# Patient Record
Sex: MALE | Race: ASIAN | Employment: FULL TIME | URBAN - METROPOLITAN AREA
[De-identification: names, ages, dates, MRNs, and addresses within clinical notes are randomized per-mention and may not be internally consistent; named-entity substitution may affect disease eponyms.]

---

## 2017-12-20 ENCOUNTER — HOSPITAL ENCOUNTER (EMERGENCY)
Facility: CLINIC | Age: 32
Discharge: HOME | End: 2017-12-20
Attending: EMERGENCY MEDICINE | Admitting: EMERGENCY MEDICINE

## 2017-12-20 VITALS
HEART RATE: 80 BPM | SYSTOLIC BLOOD PRESSURE: 118 MMHG | HEIGHT: 66 IN | DIASTOLIC BLOOD PRESSURE: 82 MMHG | BODY MASS INDEX: 28.93 KG/M2 | RESPIRATION RATE: 13 BRPM | OXYGEN SATURATION: 99 % | WEIGHT: 180 LBS | TEMPERATURE: 97.9 F

## 2017-12-20 DIAGNOSIS — L03.211 CELLULITIS OF FACE: ICD-10-CM

## 2017-12-20 LAB
BASOPHILS # BLD AUTO: 0 10E9/L (ref 0–0.2)
BASOPHILS NFR BLD AUTO: 0.5 %
DIFFERENTIAL METHOD BLD: NORMAL
EOSINOPHIL # BLD AUTO: 0.1 10E9/L (ref 0–0.7)
EOSINOPHIL NFR BLD AUTO: 3 %
ERYTHROCYTE [DISTWIDTH] IN BLOOD BY AUTOMATED COUNT: 13.1 % (ref 10–15)
HCT VFR BLD AUTO: 40 % (ref 40–53)
HGB BLD-MCNC: 13.6 G/DL (ref 13.3–17.7)
IMM GRANULOCYTES # BLD: 0 10E9/L (ref 0–0.4)
IMM GRANULOCYTES NFR BLD: 0.3 %
LYMPHOCYTES # BLD AUTO: 1.3 10E9/L (ref 0.8–5.3)
LYMPHOCYTES NFR BLD AUTO: 31.5 %
MCH RBC QN AUTO: 29.7 PG (ref 26.5–33)
MCHC RBC AUTO-ENTMCNC: 34 G/DL (ref 31.5–36.5)
MCV RBC AUTO: 87 FL (ref 78–100)
MONOCYTES # BLD AUTO: 0.4 10E9/L (ref 0–1.3)
MONOCYTES NFR BLD AUTO: 10.3 %
NEUTROPHILS # BLD AUTO: 2.2 10E9/L (ref 1.6–8.3)
NEUTROPHILS NFR BLD AUTO: 54.4 %
NRBC # BLD AUTO: 0 10*3/UL
NRBC BLD AUTO-RTO: 0 /100
PLATELET # BLD AUTO: 195 10E9/L (ref 150–450)
RBC # BLD AUTO: 4.58 10E12/L (ref 4.4–5.9)
WBC # BLD AUTO: 4 10E9/L (ref 4–11)

## 2017-12-20 PROCEDURE — 96365 THER/PROPH/DIAG IV INF INIT: CPT

## 2017-12-20 PROCEDURE — 85025 COMPLETE CBC W/AUTO DIFF WBC: CPT | Performed by: EMERGENCY MEDICINE

## 2017-12-20 PROCEDURE — 25000128 H RX IP 250 OP 636: Performed by: EMERGENCY MEDICINE

## 2017-12-20 PROCEDURE — 99284 EMERGENCY DEPT VISIT MOD MDM: CPT | Mod: 25

## 2017-12-20 RX ORDER — SULFAMETHOXAZOLE/TRIMETHOPRIM 800-160 MG
1 TABLET ORAL 2 TIMES DAILY
Qty: 7 TABLET | Refills: 0 | Status: SHIPPED | OUTPATIENT
Start: 2017-12-20 | End: 2017-12-25

## 2017-12-20 RX ORDER — CEPHALEXIN 500 MG/1
500 CAPSULE ORAL 4 TIMES DAILY
Qty: 28 CAPSULE | Refills: 0 | Status: SHIPPED | OUTPATIENT
Start: 2017-12-20 | End: 2017-12-27

## 2017-12-20 RX ORDER — CEFAZOLIN SODIUM 1 G/3ML
1 INJECTION, POWDER, FOR SOLUTION INTRAMUSCULAR; INTRAVENOUS ONCE
Status: DISCONTINUED | OUTPATIENT
Start: 2017-12-20 | End: 2017-12-20

## 2017-12-20 RX ADMIN — CEFAZOLIN SODIUM 1 G: 1 SOLUTION INTRAVENOUS at 19:39

## 2017-12-20 ASSESSMENT — ENCOUNTER SYMPTOMS
FACIAL SWELLING: 1
HEADACHES: 1
EYE PAIN: 1

## 2017-12-20 NOTE — ED AVS SNAPSHOT
Emergency Department    [address] [facility] [address]    Phone:  [phone]    Fax:  [phone]                                       Zachary Randolph   MRN: [id number]    Department:   Emergency Department   Date of Visit:  12/20/2017           Patient Information     Date Of Birth          1985        Your diagnoses for this visit were:     Cellulitis of face left eyelid       You were seen by Charbel Bustillos MD.      Follow-up Information     Follow up with [facility] In 2 days.    Contact information:    [address] [facility] [address] [phone]          Follow up with  Emergency Department.    Specialty:  EMERGENCY MEDICINE    Why:  As needed, If symptoms worsen    Contact information:    [address] [facility] [address] [phone]        Discharge Instructions       *Take medications as prescribed.  Keflex as directed, bactrim if redness and swelling are not improving by tomorrow. Continue your current medications  *Follow-up with your doctor for a recheck in the next 24-48 hours.  *Return if you develop fever, rapid spread or pain/redness/warmth, worsening pain, faint or feel like you will faint or become worse in any way.      Discharge Instructions  Cellulitis    Cellulitis is an infection of the skin that occurs when bacteria enter the skin.   Symptoms are generally redness, swelling, warmth and pain.  Your infection appeared to be appropriate to treat at home with antibiotics.  However, sometimes your infection may be worse than it seemed at first, or may worsen with time. If you have new or worse symptoms, you may need to be seen again in the Emergency Department or by your primary doctor.    Return to the Emergency Department if:    The redness, pain, or swelling gets a lot worse.  If the red area was marked, return if it is red beyond the marked area.    You are unable to get your antibiotics, or are vomiting  "them up or you can t take them.    You are feeling more ill, weak or lightheaded.    You start to run a new fever (temperature >101).    Anything else about the infection worries or concerns you.  Treatment:    Start your antibiotics right away, and take them as prescribed. Be sure to finish the whole prescription, even if you are better.    Apply a heating pad, warm packs, or warm water soaks to the infected area for 15 minutes at a time, at least 3 times a day. Do not use a heating pad on your feet or legs if you have diabetes. Do not sleep with a heating pad on, since this can cause burns or skin injury.    Rest your injured area for at least 1-2 days. After that you may start using your extremity again as long as there is not too much pain.     Raise the injured area above the level of your heart as much as possible in the first 1-2 days.    Tylenol  (acetaminophen), Motrin  (ibuprofen), or Advil  (ibuprofen) may help may help reduce pain and fever and may help you feel more comfortable. Be sure to read and follow the package directions, and ask your doctor if you have questions.    Follow-up with your doctor:    Re-check in clinic within 2-3 days.  Probiotics: If you have been given an antibiotic, you may want to also take a probiotic pill or eat yogurt with live cultures. Probiotics have \"good bacteria\" to help your intestines stay healthy. Studies have shown that probiotics help prevent diarrhea and other intestine problems (including C. diff infection) when you take antibiotics. You can buy these without a prescription in the pharmacy section of the store.     If you were given a prescription for medicine here today, be sure to read all of the information (including the package insert) that comes with your prescription.  This will include important information about the medicine, its side effects, and any warnings that you need to know about.  The pharmacist who fills the prescription can provide more " information and answer questions you may have about the medicine.  If you have questions or concerns that the pharmacist cannot address, please call or return to the Emergency Department.     24 Hour Appointment Hotline       To make an appointment at any [facility], call [phone] ([phone]). If you don't have a family doctor or clinic, we will help you find one. [facility] clinics are conveniently located to serve the needs of you and your family.             Review of your medicines      START taking        Dose / Directions Last dose taken    cephALEXin 500 MG capsule   Commonly known as:  KEFLEX   Dose:  500 mg   Quantity:  28 capsule        Take 1 capsule (500 mg) by mouth 4 times daily for 7 days   Refills:  0        sulfamethoxazole-trimethoprim 800-160 MG per tablet   Commonly known as:  BACTRIM DS   Dose:  1 tablet   Quantity:  7 tablet        Take 1 tablet by mouth 2 times daily for 5 days   Refills:  0                Prescriptions were sent or printed at these locations (2 Prescriptions)                   Other Prescriptions                Printed at Department/Unit printer (2 of 2)         cephALEXin (KEFLEX) 500 MG capsule               sulfamethoxazole-trimethoprim (BACTRIM DS) 800-160 MG per tablet                Procedures and tests performed during your visit     CBC with platelets + differential      Orders Needing Specimen Collection     None      Pending Results     No orders found from 12/18/2017 to 12/21/2017.            Pending Culture Results     No orders found from 12/18/2017 to 12/21/2017.            Pending Results Instructions     If you had any lab results that were not finalized at the time of your Discharge, you can call the ED Lab Result RN at [phone]. You will be contacted by this team for any positive Lab results or changes in treatment. The nurses are available 7 days a week from 10A to 6:30P.  You can leave a message 24 hours per day and they will return your  call.        Test Results From Your Hospital Stay        12/20/2017  7:50 PM      Component Results     Component Value Ref Range & Units Status    WBC 4.0 4.0 - 11.0 10e9/L Final    RBC Count 4.58 4.4 - 5.9 10e12/L Final    Hemoglobin 13.6 13.3 - 17.7 g/dL Final    Hematocrit 40.0 40.0 - 53.0 % Final    MCV 87 78 - 100 fl Final    MCH 29.7 26.5 - 33.0 pg Final    MCHC 34.0 31.5 - 36.5 g/dL Final    RDW 13.1 10.0 - 15.0 % Final    Platelet Count 195 150 - 450 10e9/L Final    Diff Method Automated Method  Final    % Neutrophils 54.4 % Final    % Lymphocytes 31.5 % Final    % Monocytes 10.3 % Final    % Eosinophils 3.0 % Final    % Basophils 0.5 % Final    % Immature Granulocytes 0.3 % Final    Nucleated RBCs 0 0 /100 Final    Absolute Neutrophil 2.2 1.6 - 8.3 10e9/L Final    Absolute Lymphocytes 1.3 0.8 - 5.3 10e9/L Final    Absolute Monocytes 0.4 0.0 - 1.3 10e9/L Final    Absolute Eosinophils 0.1 0.0 - 0.7 10e9/L Final    Absolute Basophils 0.0 0.0 - 0.2 10e9/L Final    Abs Immature Granulocytes 0.0 0 - 0.4 10e9/L Final    Absolute Nucleated RBC 0.0  Final                Clinical Quality Measure: Blood Pressure Screening     Your blood pressure was checked while you were in the emergency department today. The last reading we obtained was  BP: 132/72 . Please read the guidelines below about what these numbers mean and what you should do about them.  If your systolic blood pressure (the top number) is less than 120 and your diastolic blood pressure (the bottom number) is less than 80, then your blood pressure is normal. There is nothing more that you need to do about it.  If your systolic blood pressure (the top number) is 120-139 or your diastolic blood pressure (the bottom number) is 80-89, your blood pressure may be higher than it should be. You should have your blood pressure rechecked within a year by a primary care provider.  If your systolic blood pressure (the top number) is 140 or greater or your diastolic  "blood pressure (the bottom number) is 90 or greater, you may have high blood pressure. High blood pressure is treatable, but if left untreated over time it can put you at risk for heart attack, stroke, or kidney failure. You should have your blood pressure rechecked by a primary care provider within the next 4 weeks.  If your provider in the emergency department today gave you specific instructions to follow-up with your doctor or provider even sooner than that, you should follow that instruction and not wait for up to 4 weeks for your follow-up visit.        Thank you for choosing [facility]       Thank you for choosing [facility] for your care. Our goal is always to provide you with excellent care. Hearing back from our patients is one way we can continue to improve our services. Please take a few minutes to complete the written survey that you may receive in the mail after you visit with us. Thank you!        [facility] Information     [email or web address] lets you send messages to your doctor, view your test results, renew your prescriptions, schedule appointments and more. To sign up, go to [email or web address] . Click on \"Log in\" on the left side of the screen, which will take you to the Welcome page. Then click on \"Sign up Now\" on the right side of the page.     You will be asked to enter the access code listed below, as well as some personal information. Please follow the directions to create your username and password.     Your access code is: [email or web address]  Expires: 3/20/2018  8:46 PM     Your access code will  in 90 days. If you need help or a new code, please call your [facility] clinic or [phone].        Care EveryWhere ID     This is your Care EveryWhere ID. This could be used by other organizations to access your [facility] medical records  [id number]        Equal Access to Services     TRENT FRANKLIN AH: Rah Sethi, ponce shukla, desiree lama " la'james king. So [facility] [phone].    ATENCIÓN: Si habla español, tiene a salinas disposición servicios gratuitos de asistencia lingüística. Llame al [phone].    We comply with applicable federal civil rights laws and [place] laws. We do not discriminate on the basis of race, color, national origin, age, disability, sex, sexual orientation, or gender identity.            After Visit Summary       This is your record. Keep this with you and show to your community pharmacist(s) and doctor(s) at your next visit.

## 2017-12-20 NOTE — LETTER
December 20, 2017      To Whom It May Concern:      Zachary Hinkle Agustín was seen in our Emergency Department today, 12/20/17.  I expect his condition to improve over the next 2 days.  He may return to work when improved.    Sincerely,        Edel Gonzales PA-C

## 2017-12-20 NOTE — ED AVS SNAPSHOT
Emergency Department    [address] [facility] [address]    Phone:  [phone]    Fax:  [phone]                                       Zachary Randolph   MRN: [id number]    Department:   Emergency Department   Date of Visit:  12/20/2017           After Visit Summary Signature Page     I have received my discharge instructions, and my questions have been answered. I have discussed any challenges I see with this plan with the nurse or doctor.    ..........................................................................................................................................  Patient/Patient Representative Signature      ..........................................................................................................................................  Patient Representative Print Name and Relationship to Patient    ..................................................               ................................................  Date                                            Time    ..........................................................................................................................................  Reviewed by Signature/Title    ...................................................              ..............................................  Date                                                            Time

## 2017-12-21 NOTE — ED PROVIDER NOTES
"  History     Chief Complaint:  Wound check    HPI   Zachary Randolph is a 32 year old male with a history of cystic acne and HIV who presents for a wound check. The patient states that he had an acne breakout yesterday on his face. When the patient woke up today he noted two cysts involving his left eye lid and he notes that the eye has been swollen shut all day. The top cyst has been draining but the lower one on the eyelid has not opened and is not draining. This is causing a large pressure like sensation in the area and the patient has reported a headache all day today. He denies having had any fevers or chills. The patient has never presented to the emergency department for an issue like this before and he states that it has never been this severe before.    Allergies:  Codeine    Medications:    Atripla  Tumeric    Past Medical History:    HIV positive  Cystic acne  Secondary syphillus  Chlamydia    Past Surgical History:    History reviewed. No pertinent past surgical history.     Family History:    The patient denies any relevant family medical history.     Social History:  The patient was accompanied to the ED by his friend.  Smoking Status: No  Smokeless Tobacco: No  Marital Status:  Single [1]    Review of Systems   HENT: Positive for facial swelling.    Eyes: Positive for pain.   Neurological: Positive for headaches.   All other systems reviewed and are negative.    Physical Exam   Vitals:  Patient Vitals for the past 24 hrs:   BP Temp Temp src Pulse Heart Rate Resp SpO2 Height Weight   12/20/17 2056 118/82 97.9  F (36.6  C) Oral 80 - 13 99 % - -   12/20/17 1742 132/72 98.2  F (36.8  C) Oral - 79 16 100 % 1.676 m (5' 6\") 81.6 kg (180 lb)        Physical Exam  General: Alert, interactive in mild distress.   Head:  Scalp is atraumatic. Left eye is swollen shut. Swelling and redness to left eyelid. There are 2 small pustules just inferior to medial eyebrow. Small area of induration surrounding " these pustules.   Eyes:  EOM intact. The pupils are equal, round, and reactive to light. No scleral icterus. No conjunctivitis. No periorbital cellulitis.   ENT:                                      Ears:  The external ears are normal.  Nose:  The external nose is normal.  Throat:  The oropharynx is normal. Mucus membranes are moist.                 Neck:  Normal range of motion. There is no rigidity.   MS:  Normal strength in all 4 extremities,  Skin:  Warm and dry  Psych:  Awake. Alert. Appropriate interactions.   Lymph: No anterior or posterior cervical lymphadenopathy noted.       Emergency Department Course     Laboratory:  Laboratory findings were communicated with the patient who voiced understanding of the findings.  CBC: AWNL (WBC 4.0, HGB 13.6, )     Interventions:  2047 Ancef 1 g IV    Emergency Department Course:  Nursing notes and vitals reviewed.  I performed an exam of the patient as documented above.   IV was inserted and blood was drawn for laboratory testing, results above.     I personally reviewed the laboratory results with the Patient and answered all related questions prior to discharge.    Impression & Plan      Medical Decision Making:  Zachary Randolph is a 32 year old male who presents with left eyelid swelling and redness.  On exam, he had 2 pustules with an area of induration surrounding these surrounded by redness and swelling of the left eyelid.  Patient's EOM were intact and the cellulitis was contained to his left eyelid with no orbital involvement. I&D not warranted at this time. Due to the cellulitis being close to the patient's eye, IV antibiotics were given.  Ancef given and on reexamination the patient was able to open his eyelid and eyelid swelling appeared to be decreased.  I was able to massage a small amount of pus out of 1 of the pustules.  Recommended keeping the area moist with bacitracin ointment.  Given Keflex and Bactrim to take at home.  Strict  return precautions discussed including fever, chills, worsening swelling/spreading redness, or any other new/concerning findings. Needs close f/u of primary care provider. Warning signs for wound given on discharge instructions and verbally; see d/c instructions.  Pt agrees w/ abx and waiting to see resolution vs abscess formation.    Diagnosis:    ICD-10-CM    1. Cellulitis of face L03.211     left eyelid      Disposition:   Discharged    Discharge Medications:  Discharge Medication List as of 12/20/2017  8:47 PM      START taking these medications    Details   cephALEXin (KEFLEX) 500 MG capsule Take 1 capsule (500 mg) by mouth 4 times daily for 7 days, Disp-28 capsule, R-0, Local Print      sulfamethoxazole-trimethoprim (BACTRIM DS) 800-160 MG per tablet Take 1 tablet by mouth 2 times daily for 5 days, Disp-7 tablet, R-0, Local Print             Scribe Disclosure:  Lio BUCHANAN, am serving as a scribe at 6:31 PM on 12/20/2017 to document services personally performed by Edel Gonzales PA-C, based on my observations and the provider's statements to me.    EMERGENCY DEPARTMENT       Edel Gonzales PA-C  12/20/17 5534

## 2017-12-21 NOTE — DISCHARGE INSTRUCTIONS
*Take medications as prescribed.  Keflex as directed, bactrim if redness and swelling are not improving by tomorrow. Continue your current medications  *Follow-up with your doctor for a recheck in the next 24-48 hours.  *Return if you develop fever, rapid spread or pain/redness/warmth, worsening pain, faint or feel like you will faint or become worse in any way.      Discharge Instructions  Cellulitis    Cellulitis is an infection of the skin that occurs when bacteria enter the skin.   Symptoms are generally redness, swelling, warmth and pain.  Your infection appeared to be appropriate to treat at home with antibiotics.  However, sometimes your infection may be worse than it seemed at first, or may worsen with time. If you have new or worse symptoms, you may need to be seen again in the Emergency Department or by your primary doctor.    Return to the Emergency Department if:    The redness, pain, or swelling gets a lot worse.  If the red area was marked, return if it is red beyond the marked area.    You are unable to get your antibiotics, or are vomiting them up or you can t take them.    You are feeling more ill, weak or lightheaded.    You start to run a new fever (temperature >101).    Anything else about the infection worries or concerns you.  Treatment:    Start your antibiotics right away, and take them as prescribed. Be sure to finish the whole prescription, even if you are better.    Apply a heating pad, warm packs, or warm water soaks to the infected area for 15 minutes at a time, at least 3 times a day. Do not use a heating pad on your feet or legs if you have diabetes. Do not sleep with a heating pad on, since this can cause burns or skin injury.    Rest your injured area for at least 1-2 days. After that you may start using your extremity again as long as there is not too much pain.     Raise the injured area above the level of your heart as much as possible in the first 1-2 days.    Tylenol   "(acetaminophen), Motrin  (ibuprofen), or Advil  (ibuprofen) may help may help reduce pain and fever and may help you feel more comfortable. Be sure to read and follow the package directions, and ask your doctor if you have questions.    Follow-up with your doctor:    Re-check in clinic within 2-3 days.  Probiotics: If you have been given an antibiotic, you may want to also take a probiotic pill or eat yogurt with live cultures. Probiotics have \"good bacteria\" to help your intestines stay healthy. Studies have shown that probiotics help prevent diarrhea and other intestine problems (including C. diff infection) when you take antibiotics. You can buy these without a prescription in the pharmacy section of the store.     If you were given a prescription for medicine here today, be sure to read all of the information (including the package insert) that comes with your prescription.  This will include important information about the medicine, its side effects, and any warnings that you need to know about.  The pharmacist who fills the prescription can provide more information and answer questions you may have about the medicine.  If you have questions or concerns that the pharmacist cannot address, please call or return to the Emergency Department.   "

## 2018-01-18 ENCOUNTER — HOSPITAL ENCOUNTER (EMERGENCY)
Facility: CLINIC | Age: 33
Discharge: HOME | End: 2018-01-18
Attending: EMERGENCY MEDICINE | Admitting: EMERGENCY MEDICINE
Payer: COMMERCIAL

## 2018-01-18 VITALS
HEART RATE: 62 BPM | RESPIRATION RATE: 18 BRPM | OXYGEN SATURATION: 98 % | WEIGHT: 196.4 LBS | SYSTOLIC BLOOD PRESSURE: 130 MMHG | BODY MASS INDEX: 31.57 KG/M2 | DIASTOLIC BLOOD PRESSURE: 74 MMHG | TEMPERATURE: 97.9 F | HEIGHT: 66 IN

## 2018-01-18 DIAGNOSIS — L03.119 CELLULITIS AND ABSCESS OF LEG: ICD-10-CM

## 2018-01-18 DIAGNOSIS — L02.419 CELLULITIS AND ABSCESS OF LEG: ICD-10-CM

## 2018-01-18 PROCEDURE — 25000125 ZZHC RX 250: Performed by: EMERGENCY MEDICINE

## 2018-01-18 PROCEDURE — 25000132 ZZH RX MED GY IP 250 OP 250 PS 637: Performed by: EMERGENCY MEDICINE

## 2018-01-18 PROCEDURE — 99284 EMERGENCY DEPT VISIT MOD MDM: CPT | Mod: 25

## 2018-01-18 PROCEDURE — 10060 I&D ABSCESS SIMPLE/SINGLE: CPT

## 2018-01-18 RX ORDER — CEPHALEXIN 500 MG/1
500 CAPSULE ORAL 4 TIMES DAILY
Qty: 40 CAPSULE | Refills: 0 | Status: SHIPPED | OUTPATIENT
Start: 2018-01-18 | End: 2018-01-28

## 2018-01-18 RX ORDER — SULFAMETHOXAZOLE/TRIMETHOPRIM 800-160 MG
1 TABLET ORAL 2 TIMES DAILY
Qty: 20 TABLET | Refills: 0 | Status: SHIPPED | OUTPATIENT
Start: 2018-01-18 | End: 2018-01-28

## 2018-01-18 RX ORDER — OXYCODONE HYDROCHLORIDE 5 MG/1
5 TABLET ORAL ONCE
Status: COMPLETED | OUTPATIENT
Start: 2018-01-18 | End: 2018-01-18

## 2018-01-18 RX ADMIN — OXYCODONE HYDROCHLORIDE 5 MG: 5 TABLET ORAL at 04:07

## 2018-01-18 RX ADMIN — Medication 5 ML: at 04:04

## 2018-01-18 ASSESSMENT — ENCOUNTER SYMPTOMS
WOUND: 1
COLOR CHANGE: 1

## 2018-01-18 NOTE — DISCHARGE INSTRUCTIONS
Abscess (Incision & Drainage)  An abscess is sometimes called a boil. It happens when bacteria get trapped under the skin and start to grow. Pus forms inside the abscess as the body responds to the bacteria. An abscess can happen with an insect bite, ingrown hair, blocked oil gland, pimple, cyst, or puncture wound.  Your healthcare provider has drained the pus from your abscess. If the abscess pocket was large, your healthcare provider may have put in gauze packing. Your provider will need to remove it on your next visit. He or she may also replace it at that time. You may not need antibiotics to treat a simple abscess, unless the infection is spreading into the skin around the wound (cellulitis).  The wound will take about 1 to 2 weeks to heal, depending on the size of the abscess. Healthy tissue will grow from the bottom and sides of the opening until it seals over.  Home care  These tips can help your wound heal:    The wound may drain for the first 2 days. Cover the wound with a clean dry dressing. Change the dressing if it becomes soaked with blood or pus.    If a gauze packing was placed inside the abscess pocket, you may be told to remove it yourself. You may do this in the shower. Once the packing is removed, you should wash the area in the shower, or clean the area as directed by your provider. Continue to do this until the skin opening has closed. Make sure you wash your hands after changing the packing or cleaning the wound.    If you were prescribed antibiotics, take them as directed until they are all gone.    You may use acetaminophen or ibuprofen to control pain, unless another pain medicine was prescribed. If you have liver disease or ever had a stomach ulcer, talk with your doctor before using these medicines.  Follow-up care  Follow up with your healthcare provider, or as advised. If a gauze packing was put in your wound, it should be removed in 1 to 2 days. Check your wound every day for any  signs that the infection is getting worse. The signs are listed below.  When to seek medical advice  Call your healthcare provider right away if any of these occur:    Increasing redness or swelling    Red streaks in the skin leading away from the wound    Increasing local pain or swelling    Continued pus draining from the wound 2 days after treatment    Fever of 100.4 F (38 C) or higher, or as directed by your healthcare provider    Boil returns when you are at home  Date Last Reviewed: 9/1/2016 2000-2017 [employer]. [address] [place], [place] [id number]. All rights reserved. This information is not intended as a substitute for professional medical care. Always follow your healthcare professional's instructions.

## 2018-01-18 NOTE — ED AVS SNAPSHOT
Emergency Department    [address] [facility] [address]    Phone:  [phone]    Fax:  [phone]                                       Zachary Randolph   MRN: [id number]    Department:   Emergency Department   Date of Visit:  1/18/2018           After Visit Summary Signature Page     I have received my discharge instructions, and my questions have been answered. I have discussed any challenges I see with this plan with the nurse or doctor.    ..........................................................................................................................................  Patient/Patient Representative Signature      ..........................................................................................................................................  Patient Representative Print Name and Relationship to Patient    ..................................................               ................................................  Date                                            Time    ..........................................................................................................................................  Reviewed by Signature/Title    ...................................................              ..............................................  Date                                                            Time

## 2018-01-18 NOTE — ED PROVIDER NOTES
"  History     Chief Complaint:  Wound Check    The history is provided by the patient.      Zachary Randolph is a 32 year old male with history of HIV who presents for a wound check. The patient reports a painful area of redness that developed on his medial left thigh 1/15/18 and it has worsened since then. The area is red and swollen and it is keeping him awake at night secondary to his pain. He is also concerned of another spot that seems to be developing between his eyebrows. He denies any fever and has no other medical concerns.    Allergies:  Codeine      Medications:    Atripla    Past Medical History:    HIV positive    Past Surgical History:    History reviewed. No pertinent surgical history.     Family History:    History reviewed. No pertinent family history.      Social History:  Presents with spouse   Tobacco use: Never  Alcohol use: Yes  PCP: [facility]    Marital Status:        Review of Systems   Constitutional: Negative for fever.   Skin: Positive for color change and wound.   All other systems reviewed and are negative.    Physical Exam     Patient Vitals for the past 24 hrs:   BP Temp Temp src Pulse Heart Rate Resp SpO2 Height Weight   01/18/18 0505 130/74 - - 62 - 18 98 % - -   01/18/18 0336 132/86 97.9  F (36.6  C) Oral - 68 18 100 % 1.676 m (5' 6\") 89.1 kg (196 lb 6.4 oz)     Physical Exam  General: Appears well-developed and well-nourished.   Head: No signs of trauma.   CV: Normal rate and regular rhythm.    Resp: Effort normal and breath sounds normal. No respiratory distress.   GI: Soft. There is no tenderness.  No rebound or guarding.  Normal bowel sounds.    MSK: Normal range of motion. no edema. No Calf tenderness.  Neuro: The patient is alert and oriented.  Speech normal.  GCS 15  Skin: Skin is warm and dry. Induration and tenderness to left upper medial thigh.  Darkening of skin over induration.  Minor induration to skin between eyebrows.  No overlying " erythema.    Psych: normal mood and affect. behavior is normal.       Emergency Department Course   Procedures:  Results for orders placed during the hospital encounter of 01/18/18   POC US SOFT TISSUE    Impression [facility] Procedure Note     Limited Bedside ED Ultrasound of Soft Tissue:    PROCEDURE: PERFORMED BY: Dr. Rubin Lawler  INDICATIONS/SYMPTOM: Skin redness, evaluate for abscess, cellulitis or foreign body  PROBE: High frequency linear probe  BODY LOCATION: Soft tissue located on extremity, left thigh.     FINDINGS: Cobblestoning of soft tissue: present   Hypoechoic fluid (ie abscess) identified: present measuring 1 cm   US utilized to access fluid pocket with sterile blade  INTERPRETATION:  The soft tissue and muscle layers were evaluated.      Findings indicate cellulitis and abscess    IMAGE DOCUMENTATION: Images were archived to PACs system.         Procedure: Incision and Drainage     LOCATIONS:  Medial left thigh    ANESTHESIA:  LET and bupivacaine.      PREPARATION:  Cleansed with Betadine    PROCEDURE:  Area was incised with # 11 Blade (Sharp Point) with a Single Straight incision.  Wound treatment included Deloculation, Purulent Drainage and Expression of Material.  Packing consisted of Plain Gauze.  Appropriate dressing was applied to cover the area.    Patient Status: Patient tolerated the procedure well. There were no complications.       Interventions:  0404: LET 5 mLs Topical  0407: Roxicodone 5 mg PO    Emergency Department Course:  Past medical records, nursing notes, and vitals reviewed.  0350: I performed an exam of the patient and obtained history, as documented above.    I rechecked the patient. I performed a POCUS and IND as noted above.     I rechecked the patient. Findings and plan explained to the Patient. Patient discharged home with instructions regarding supportive care, medications, and reasons to return. The importance of close follow-up was reviewed.     Impression & Plan    Medical Decision Making:  Zachary Randolph is a 32 year old gentleman who presents due to an area of concern for infection to his left thigh. For last few days he has had a firm tender area that is gradually getting worse.  He also has a small place between his eyebrows. On my evaluation, he did have some induration to the bilateral areas, but no obvious fluctuance or drainage.  I did do a bedside ultrasound which showed an area fluid collection concerning for an abscess to the left thigh but there is no fluid collection on the forehead area.  I did perform an I&D with some purulent drainage.  Packing was placed in the thigh.  Recommended warm compresses to both areas the patient was given a prescription for Keflex and Bactrim.  Patient does have HIV, but reports that his viral load is not detectable and his CD4 count is normal.  He has an appointment to see his doctor in 4 days which I recommend that he keep for recheck.  Instructed to return to the ER for any new or worsening symptoms or further concerns.      Diagnosis:    ICD-10-CM    1. Cellulitis and abscess of leg L03.119     L02.419        Disposition:  Discharged to home with plan as outlined.    Discharge Medications:  Discharge Medication List as of 1/18/2018  5:01 AM      START taking these medications    Details   cephALEXin (KEFLEX) 500 MG capsule Take 1 capsule (500 mg) by mouth 4 times daily for 10 days, Disp-40 capsule, R-0, Local Print      sulfamethoxazole-trimethoprim (BACTRIM DS) 800-160 MG per tablet Take 1 tablet by mouth 2 times daily for 10 days, Disp-20 tablet, R-0, Local Print               Derian Duffy  1/18/2018    EMERGENCY DEPARTMENT  Derian BUCHANAN am serving as a scribe at 3:50 AM on 1/18/2018 to document services personally performed by Rubin Lawler MD based on my observations and the provider's statements to me.       Rubin Lawler MD  01/19/18 8699

## 2018-01-18 NOTE — ED AVS SNAPSHOT
Emergency Department    [address] [facility] [address]    Phone:  [phone]    Fax:  [phone]                                       Zachary Randolph   MRN: [id number]    Department:   Emergency Department   Date of Visit:  1/18/2018           Patient Information     Date Of Birth          1985        Your diagnoses for this visit were:     Cellulitis and abscess of leg        You were seen by Rubin Lawler MD.      Follow-up Information     Follow up with [facility] On 1/22/2018.    Contact information:    [address] [facility] [address] [phone]          Follow up with  Emergency Department.    Specialty:  EMERGENCY MEDICINE    Why:  As needed, If symptoms worsen    Contact information:    [address] [facility] [address] [phone]        Discharge Instructions         Abscess (Incision & Drainage)  An abscess is sometimes called a boil. It happens when bacteria get trapped under the skin and start to grow. Pus forms inside the abscess as the body responds to the bacteria. An abscess can happen with an insect bite, ingrown hair, blocked oil gland, pimple, cyst, or puncture wound.  Your healthcare provider has drained the pus from your abscess. If the abscess pocket was large, your healthcare provider may have put in gauze packing. Your provider will need to remove it on your next visit. He or she may also replace it at that time. You may not need antibiotics to treat a simple abscess, unless the infection is spreading into the skin around the wound (cellulitis).  The wound will take about 1 to 2 weeks to heal, depending on the size of the abscess. Healthy tissue will grow from the bottom and sides of the opening until it seals over.  Home care  These tips can help your wound heal:    The wound may drain for the first 2 days. Cover the wound with a clean dry dressing. Change the dressing if it becomes soaked with  blood or pus.    If a gauze packing was placed inside the abscess pocket, you may be told to remove it yourself. You may do this in the shower. Once the packing is removed, you should wash the area in the shower, or clean the area as directed by your provider. Continue to do this until the skin opening has closed. Make sure you wash your hands after changing the packing or cleaning the wound.    If you were prescribed antibiotics, take them as directed until they are all gone.    You may use acetaminophen or ibuprofen to control pain, unless another pain medicine was prescribed. If you have liver disease or ever had a stomach ulcer, talk with your doctor before using these medicines.  Follow-up care  Follow up with your healthcare provider, or as advised. If a gauze packing was put in your wound, it should be removed in 1 to 2 days. Check your wound every day for any signs that the infection is getting worse. The signs are listed below.  When to seek medical advice  Call your healthcare provider right away if any of these occur:    Increasing redness or swelling    Red streaks in the skin leading away from the wound    Increasing local pain or swelling    Continued pus draining from the wound 2 days after treatment    Fever of 100.4 F (38 C) or higher, or as directed by your healthcare provider    Boil returns when you are at home  Date Last Reviewed: 9/1/2016 2000-2017 [employer]. [address] [place] [address], [place] [id number]. All rights reserved. This information is not intended as a substitute for professional medical care. Always follow your healthcare professional's instructions.          24 Hour Appointment Hotline       To make an appointment at any [facility] clinic, call [phone] ([phone]). If you don't have a family doctor or clinic, we will help you find one. [facility] clinics are conveniently located to serve the needs of you and your family.             Review of your medicines       START taking        Dose / Directions Last dose taken    cephALEXin 500 MG capsule   Commonly known as:  KEFLEX   Dose:  500 mg   Quantity:  40 capsule        Take 1 capsule (500 mg) by mouth 4 times daily for 10 days   Refills:  0        sulfamethoxazole-trimethoprim 800-160 MG per tablet   Commonly known as:  BACTRIM DS   Dose:  1 tablet   Quantity:  20 tablet        Take 1 tablet by mouth 2 times daily for 10 days   Refills:  0                Prescriptions were sent or printed at these locations (2 Prescriptions)                   Other Prescriptions                Printed at Department/Unit printer (2 of 2)         cephALEXin (KEFLEX) 500 MG capsule               sulfamethoxazole-trimethoprim (BACTRIM DS) 800-160 MG per tablet                Procedures and tests performed during your visit     POC US SOFT TISSUE      Orders Needing Specimen Collection     None      Pending Results     Date and Time Order Name Status Description    1/18/2018 0353 POC US SOFT TISSUE In process             Pending Culture Results     No orders found from 1/16/2018 to 1/19/2018.            Pending Results Instructions     If you had any lab results that were not finalized at the time of your Discharge, you can call the ED Lab Result RN at [phone]. You will be contacted by this team for any positive Lab results or changes in treatment. The nurses are available 7 days a week from 10A to 6:30P.  You can leave a message 24 hours per day and they will return your call.        Test Results From Your Hospital Stay        1/18/2018  3:53 AM      Result not yet available     Exam Begun                Clinical Quality Measure: Blood Pressure Screening     Your blood pressure was checked while you were in the emergency department today. The last reading we obtained was  BP: 132/86 . Please read the guidelines below about what these numbers mean and what you should do about them.  If your systolic blood pressure (the top number) is  "less than 120 and your diastolic blood pressure (the bottom number) is less than 80, then your blood pressure is normal. There is nothing more that you need to do about it.  If your systolic blood pressure (the top number) is 120-139 or your diastolic blood pressure (the bottom number) is 80-89, your blood pressure may be higher than it should be. You should have your blood pressure rechecked within a year by a primary care provider.  If your systolic blood pressure (the top number) is 140 or greater or your diastolic blood pressure (the bottom number) is 90 or greater, you may have high blood pressure. High blood pressure is treatable, but if left untreated over time it can put you at risk for heart attack, stroke, or kidney failure. You should have your blood pressure rechecked by a primary care provider within the next 4 weeks.  If your provider in the emergency department today gave you specific instructions to follow-up with your doctor or provider even sooner than that, you should follow that instruction and not wait for up to 4 weeks for your follow-up visit.        Thank you for choosing [facility]       Thank you for choosing [facility] for your care. Our goal is always to provide you with excellent care. Hearing back from our patients is one way we can continue to improve our services. Please take a few minutes to complete the written survey that you may receive in the mail after you visit with us. Thank you!        [employer] Information     [employer] lets you send messages to your doctor, view your test results, renew your prescriptions, schedule appointments and more. To sign up, go to [email or web address] . Click on \"Log in\" on the left side of the screen, which will take you to the Welcome page. Then click on \"Sign up Now\" on the right side of the page.     You will be asked to enter the access code listed below, as well as some personal information. Please follow the directions to create your username and " password.     Your access code is: [email or web address]  Expires: 3/20/2018  8:46 PM     Your access code will  in 90 days. If you need help or a new code, please call your [place] clinic or [phone].        Care EveryWhere ID     This is your Care EveryWhere ID. This could be used by other organizations to access your [place] medical records  [id number]        Equal Access to Services     TRENT FRANKLIN : Rah lovello Somisael, waaxda luqadaha, qaybta kaalmada adeegyada, desiree king . So [facility] [phone].    ATENCIÓN: Si habla español, tiene a salinas disposición servicios gratuitos de asistencia lingüística. Llame al [phone].    We comply with applicable federal civil rights laws and [place] laws. We do not discriminate on the basis of race, color, national origin, age, disability, sex, sexual orientation, or gender identity.            After Visit Summary       This is your record. Keep this with you and show to your community pharmacist(s) and doctor(s) at your next visit.

## 2018-01-19 ENCOUNTER — NURSE TRIAGE (OUTPATIENT)
Dept: NURSING | Facility: CLINIC | Age: 33
End: 2018-01-19

## 2018-01-19 ASSESSMENT — ENCOUNTER SYMPTOMS: FEVER: 0

## 2018-01-19 NOTE — TELEPHONE ENCOUNTER
Additional Information    Negative: [1] SEVERE weakness (i.e., unable to walk or barely able to walk, requires support) AND [2] new onset or worsening    Negative: [1] Weakness (i.e., paralysis, loss of muscle strength) of the face, arm / hand, or leg / foot on one side of the body AND [2] sudden onset AND [3] present now    Negative: [1] Numbness (i.e., loss of sensation) of the face, arm / hand, or leg / foot on one side of the body AND [2] sudden onset AND [3] present now    Negative: [1] Loss of speech or garbled speech AND [2] sudden onset AND [3] present now    Negative: Difficult to awaken or acting confused  (e.g., disoriented, slurred speech)    Negative: Sounds like a life-threatening emergency to the triager    Negative: Confusion, disorientation, or hallucinations is main symptom    Negative: Neck pain is main symptom (and having weakness, numbness, or tingling in arm / hand because of neck pain)    Negative: Back pain is main symptom (and having weakness, numbness, or tingling in leg because of back pain)    Negative: Hand pain is main symptom (and having mild weakness, numbness, or tingling in hand related to hand pain)    Negative: Dizziness is main symptom    Negative: Vision loss or change is main symptom    Negative: Followed a head injury within last 3 days    Negative: Followed a neck injury within last 3 days    Negative: [1] Tingling in both hands and/or feet AND [2] breathing faster than normal AND [3] feels similar to prior panic attack or hyperventilation episode    Negative: Weakness in both sides of the body or weakness all over    Negative: Headache  (and neurologic deficit)    Negative: [1] Back pain AND [2] numbness (loss of sensation) in groin or rectal area    Negative: [1] Unable to urinate (or only a few drops) > 4 hours AND [2] bladder feels very full (e.g., palpable bladder or strong urge to urinate)    Negative: [1] Loss of control of bowel or bladder (i.e., incontinence) AND  [2] new onset    Negative: [1] Weakness (i.e., paralysis, loss of muscle strength) of the face, arm / hand, or leg / foot on one side of the body AND [2] sudden onset AND [3] brief (now gone)    Negative: [1] Numbness (i.e., loss of sensation) of the face, arm / hand, or leg / foot on one side of the body AND [2] sudden onset AND [3] brief (now gone)    Negative: [1] Loss of speech or garbled speech AND [2] sudden onset AND [3] brief (now gone)    Negative: Bell's palsy suspected (i.e., weakness on only one side of the face, developing over hours to days, no other symptoms)    Negative: Patient sounds very sick or weak to the triager    Negative: Neck pain  (and neurologic deficit)    Negative: Back pain  (and neurologic deficit)    Negative: [1] Weakness of the face, arm / hand, or leg / foot on one side of the body AND [2] gradual onset (e.g., days to weeks) AND [3] present now    Negative: [1] Numbness (i.e., loss of sensation) of the face, arm / hand, or leg / foot on one side of the body AND [2] gradual onset (e.g., days to weeks) AND [3] present now    Negative: [1] Loss of speech or garbled speech AND [2] gradual onset (e.g., days to weeks) AND [3] present now    Negative: [1] Tingling (e.g., pins and needles) of the face, arm / hand, or leg / foot on one side of the body AND [2] present now    Negative: [1] Loss of speech or garbled speech AND [2] is a chronic symptom (recurrent or ongoing AND present > 4 weeks)    Negative: [1] Weakness of arm / hand, or leg / foot AND [2] is a chronic symptom (recurrent or ongoing AND present > 4 weeks)    Negative: [1] Numbness or tingling in one or both hands AND [2] is a chronic symptom (recurrent or ongoing AND present > 4 weeks)    Negative: [1] Numbness or tingling in one or both feet AND [2] is a chronic symptom (recurrent or ongoing AND present > 4 weeks)    [1] Tingling in hand (e.g., pins and needles) AND [2] after prolonged laying on arm AND [3]  brief (now  "gone)    Answer Assessment - Initial Assessment Questions  1. SYMPTOM: \"What is the main symptom you are concerned about?\" (e.g., weakness, numbness)      Numbness, tingling hands  2. ONSET: \"When did this start?\" (minutes, hours, days; while sleeping)      While sleeping tonight  3. LAST NORMAL: \"When was the last time you were normal (no symptoms)?\"      Before bed, getting back to normal now  4. PATTERN \"Does this come and go, or has it been constant since it started?\"  \"Is it present now?\"      Going away now while we are talking  5. CARDIAC SYMPTOMS: \"Have you had any of the following symptoms: chest pain, difficulty breathing, palpitations?\"      no  6. NEUROLOGIC SYMPTOMS: \"Have you had any of the following symptoms: headache, dizziness, vision loss, double vision, changes in speech, unsteady on your feet?\"      ?related to neck tightness  7. OTHER SYMPTOMS: \"Do you have any other symptoms?\"      no  8. PREGNANCY: \"Is there any chance you are pregnant?\" \"When was your last menstrual period?\"      no    Protocols used: NEUROLOGIC DEFICIT-ADULT-AH    "

## 2021-08-27 ENCOUNTER — HOSPITAL ENCOUNTER (EMERGENCY)
Facility: CLINIC | Age: 36
Discharge: HOME | End: 2021-08-27
Attending: PHYSICIAN ASSISTANT | Admitting: PHYSICIAN ASSISTANT

## 2021-08-27 VITALS
SYSTOLIC BLOOD PRESSURE: 144 MMHG | OXYGEN SATURATION: 99 % | DIASTOLIC BLOOD PRESSURE: 75 MMHG | TEMPERATURE: 98 F | BODY MASS INDEX: 27.48 KG/M2 | RESPIRATION RATE: 16 BRPM | HEIGHT: 66 IN | HEART RATE: 87 BPM | WEIGHT: 171 LBS

## 2021-08-27 DIAGNOSIS — M25.572 LEFT ANKLE PAIN: ICD-10-CM

## 2021-08-27 PROCEDURE — 99282 EMERGENCY DEPT VISIT SF MDM: CPT

## 2021-08-27 ASSESSMENT — ENCOUNTER SYMPTOMS: JOINT SWELLING: 1

## 2021-08-27 ASSESSMENT — MIFFLIN-ST. JEOR: SCORE: 1653.4

## 2021-08-27 NOTE — LETTER
August 27, 2021      To Whom It May Concern:      Zachary Hinkle Agustín was seen in our Emergency Department today, 08/27/21.  I expect his condition to improve over the next 4 days.  He may return to work when improved.    Sincerely,        Celso Soto PA-C

## 2021-08-28 NOTE — ED PROVIDER NOTES
"  History   Chief Complaint:  Ankle Pain       HPI   Zachary Randolph is a 35 year old male who presents with left ankle pain and swelling. The patient stated that he has been having ankle swelling and pain for the last four months and lately has been getting worse. He denies sustaining any injury but has been working a lot more lately and he works at the [employer]. This has caused him to be on his feet a lot more.  He can bear weight on it.  He has not taken anything recently for pain.  No fever, IV drug use, rash, or hx of surgery to this area.  He did visit a clinic where he had it x-rayed about 2 months ago.    Review of Systems   Musculoskeletal: Positive for joint swelling (Left ankle pain and swelling).   All other systems reviewed and are negative.    Allergies:  Codeine    Medications:  Genvoya    Past Medical History:    HIV    Past Surgical History:    The patient denies past surgical history.     Family History:    The patient denies past family history.     Social History:  The patient was brought to the emergency department by private vehicle.  The patient presents to the emergency department alone.    Physical Exam     Patient Vitals for the past 24 hrs:   BP Temp Temp src Pulse Resp SpO2 Height Weight   08/27/21 1927 (!) 144/75 98  F (36.7  C) Temporal 87 16 99 % 1.676 m (5' 6\") 77.6 kg (171 lb)       Physical Exam  General: Alert and oriented.    Head: Normocephalic. External ears and nose normal.    Eyes: Pupils equal and round.  Normal tracking.    Pulmonary/Chest: Effort and rate normal.  No peripheral edema.    MSK:  Normal ROM in ankle without bruising or focal ttp over tibia, fibula or lateral malleolus.  No redness, or warmth.  No swelling or ttp over foot.  No swelling or ttp over the medial malleolus.  Negative squeeze test for syndesmosis injury. No focal lateral talar tenderness. No tenderness over shaft or proximal tibia/fibula and normal ROM in knee without pain.     "   SKIN:  Warm and dry with strong DP or posterior tibial pulses and normal capillary refill.  No bruising or swelling over plantar surface of foot.    NEURO:  Normal strength and sensation throughout the foot and toes.     PSYCH:  Normal affect      Emergency Department Course     Emergency Department Course:    Reviewed:  I reviewed nursing notes, vitals, past medical history and care everywhere    Assessments:  I performed an exam of the patient.    Disposition:  The patient was discharged to home.       Impression & Plan     Medical Decision Makin yo male presents with ongoing left ankle pain for months worse when standing on it for long perioids of the day at work.  Previously x-rayed and negative.  No known trauma.  Recommended repeat films to assess for stress fracture but patient declines and requests MRI.  I explained that previously missed fx on xray might be visible now and thus although previously preformed I feel x-rays would be beneficial but patient again declines x-ray evaluation.   I explained that MRI was not indicated emergently but rather that follow-up with orthopedics as an outpatient would be appropriate and at that time MRI if indicated could be obtained.  Will give him a note for work to stay off feet for the next several days as this seems to aggravate it.  No evidence of septic joint, gout, cellulitis, NSTI, DVT, arterial insufficiency or compartment syndrome.  No recent trauma and able to bear weight.  He already has velcro brace which he has been wearing and will continue to use. He will call ortho Monday to schedule appointment for further evaluation and to assess for occult stress fracture. Discussed PRICE.  Will return if new or worsening sxs fever, redness, swelling, increased pain, inability to bear weight.     Diagnosis:    ICD-10-CM    1. Left ankle pain  M25.572        Scribe Disclosure:  Roque BUCHANAN, am serving as a scribe at 8:29 PM on 2021 to document  services personally performed by Celso Soto, based on my observations and the provider's statements to me.            Celso Soto, CRIS  08/28/21 0111

## 2021-08-28 NOTE — ED NOTES
X-ray came to get pt an pt states he doesn't want the x-ray and just wants to leave. CARL Houston aware.